# Patient Record
(demographics unavailable — no encounter records)

---

## 2024-12-31 NOTE — HISTORY OF PRESENT ILLNESS
[FreeTextEntry1] : 93 year old female with hypertension, dyslipidemia, atrial fibrillation on Eliquis, family history of premature CAD, former smoker, COPD, non-small cell lung CA, nontoxic multinodular goiter, right hip fracture s/p right trochanter nailing 9/19/21.

## 2024-12-31 NOTE — REASON FOR VISIT
[Other: _____] : [unfilled] [FreeTextEntry1] : Lor Dye presents for follow up visit.   Ms. Dye was last seen on 3/2023. She had elevated creatinine with chlorthalidone, medication was discontinued, and she was started on amlodipine.   Ms. Dye reports feeling well. She denies chest pain, SOB, palpitations, dizziness or syncope. She is seen sitting on a wheelchair. She walks inside her house with using a walker.   No hospital admissions since last visit.

## 2024-12-31 NOTE — REVIEW OF SYSTEMS
[Hearing Loss] : hearing loss [SOB] : no shortness of breath [Dyspnea on exertion] : not dyspnea during exertion [Chest Discomfort] : no chest discomfort [Lower Ext Edema] : no extremity edema [Palpitations] : no palpitations [Syncope] : no syncope [Cough] : no cough [Blood in Stool] : no blood in stool [Dysuria] : no dysuria [Dizziness] : no dizziness [Confusion] : no confusion was observed [Easy Bleeding] : no tendency for easy bleeding [Easy Bruising] : a tendency for easy bruising

## 2024-12-31 NOTE — DISCUSSION/SUMMARY
[Patient] : the patient [With ___] : with [unfilled] [FreeTextEntry2] : and daughter [EKG obtained to assist in diagnosis and management of assessed problem(s)] : EKG obtained to assist in diagnosis and management of assessed problem(s)

## 2024-12-31 NOTE — CARDIOLOGY SUMMARY
[No Ischemia] : no Ischemia [___] : [unfilled] [LVEF ___%] : LVEF [unfilled]% [de-identified] : 12/31/24 Atrial fibrillation rate controlled

## 2024-12-31 NOTE — PHYSICAL EXAM
[No Acute Distress] : no acute distress [Normal S1, S2] : normal S1, S2 [No Murmur] : no murmur [Irregularly Irregular] : irregularly irregular [Clear Lung Fields] : clear lung fields [Good Air Entry] : good air entry [No Respiratory Distress] : no respiratory distress  [No Edema] : no edema [Venous varicosities] : venous varicosities [Moves all extremities] : moves all extremities [No Focal Deficits] : no focal deficits [Normal Speech] : normal speech [Alert and Oriented] : alert and oriented [de-identified] : sitting on wheelchair